# Patient Record
Sex: MALE | Race: WHITE | NOT HISPANIC OR LATINO
[De-identification: names, ages, dates, MRNs, and addresses within clinical notes are randomized per-mention and may not be internally consistent; named-entity substitution may affect disease eponyms.]

---

## 2017-12-13 ENCOUNTER — APPOINTMENT (OUTPATIENT)
Dept: ORTHOPEDIC SURGERY | Facility: CLINIC | Age: 62
End: 2017-12-13
Payer: COMMERCIAL

## 2017-12-13 VITALS — HEIGHT: 72 IN | WEIGHT: 175 LBS | RESPIRATION RATE: 16 BRPM | BODY MASS INDEX: 23.7 KG/M2

## 2017-12-13 DIAGNOSIS — Z00.00 ENCOUNTER FOR GENERAL ADULT MEDICAL EXAMINATION W/OUT ABNORMAL FINDINGS: ICD-10-CM

## 2017-12-13 DIAGNOSIS — Z78.9 OTHER SPECIFIED HEALTH STATUS: ICD-10-CM

## 2017-12-13 DIAGNOSIS — K31.9 DISEASE OF STOMACH AND DUODENUM, UNSPECIFIED: ICD-10-CM

## 2017-12-13 DIAGNOSIS — Z87.39 PERSONAL HISTORY OF OTHER DISEASES OF THE MUSCULOSKELETAL SYSTEM AND CONNECTIVE TISSUE: ICD-10-CM

## 2017-12-13 PROCEDURE — 20605 DRAIN/INJ JOINT/BURSA W/O US: CPT | Mod: RT

## 2017-12-13 PROCEDURE — 99203 OFFICE O/P NEW LOW 30 MIN: CPT | Mod: 25

## 2017-12-13 PROCEDURE — 73070 X-RAY EXAM OF ELBOW: CPT | Mod: RT

## 2023-07-17 ENCOUNTER — NON-APPOINTMENT (OUTPATIENT)
Age: 68
End: 2023-07-17

## 2023-07-21 ENCOUNTER — APPOINTMENT (OUTPATIENT)
Dept: PULMONOLOGY | Facility: CLINIC | Age: 68
End: 2023-07-21
Payer: MEDICARE

## 2023-07-21 VITALS
DIASTOLIC BLOOD PRESSURE: 80 MMHG | WEIGHT: 177 LBS | HEART RATE: 73 BPM | SYSTOLIC BLOOD PRESSURE: 120 MMHG | OXYGEN SATURATION: 98 % | HEIGHT: 72 IN | BODY MASS INDEX: 23.98 KG/M2

## 2023-07-21 DIAGNOSIS — R91.1 SOLITARY PULMONARY NODULE: ICD-10-CM

## 2023-07-21 DIAGNOSIS — E78.5 HYPERLIPIDEMIA, UNSPECIFIED: ICD-10-CM

## 2023-07-21 DIAGNOSIS — R05.3 CHRONIC COUGH: ICD-10-CM

## 2023-07-21 PROCEDURE — 99204 OFFICE O/P NEW MOD 45 MIN: CPT

## 2023-07-21 RX ORDER — ROSUVASTATIN CALCIUM 20 MG/1
20 TABLET, FILM COATED ORAL
Refills: 0 | Status: ACTIVE | COMMUNITY

## 2023-07-21 NOTE — HISTORY OF PRESENT ILLNESS
[FreeTextEntry1] : Intermittent cough and a history of pulmonary nodule. [de-identified] :   68-year-old male non-smoker.  About 3 years ago he developed a cough which lasted about 2 to 3 months.  He was tested for COVID and was negative.  The cough resolved.  The next year the cough came back and lasted about 3 to 4 months.  There were no preceding URI type symptoms.  The etiology of the cough is unclear.  A chest x-ray was done at that time and was negative.  The cough then reoccurred this past winter and lasted about 2 months.  The cough was dry with no shortness of breath or wheezing.  There were no nasal symptoms and rare heartburn.  The cough has been better over the past 3 months.  He now is asymptomatic.  He also underwent a CT calcium score in May which revealed a 3 mm left lower lobe nodule.  He is somewhat anxious about this finding.  There is no known allergies.  He denies anorexia or weight loss fever or chills.  Past medical history hyperlipidemia on Crestor.  Current O2 sat 99 on room air.

## 2023-07-21 NOTE — ASSESSMENT
[FreeTextEntry1] :   Patient with an intermittent cough of unclear etiology.  This may be due to post viral URI inflammation.  In any case I feel this is of no clinical significance and patient is advised to return if he starts coughing again.  In terms of the 3 mm nodule in the non-smoker I have explained to the patient that the guidelines tell us not to repeat the CAT scan.  Patient will return on a as needed basis.  He has been reassured.

## 2024-02-06 ENCOUNTER — APPOINTMENT (OUTPATIENT)
Dept: ORTHOPEDIC SURGERY | Facility: CLINIC | Age: 69
End: 2024-02-06
Payer: MEDICARE

## 2024-02-06 VITALS — BODY MASS INDEX: 23.7 KG/M2 | RESPIRATION RATE: 16 BRPM | HEIGHT: 72 IN | WEIGHT: 175 LBS

## 2024-02-06 DIAGNOSIS — M72.0 PALMAR FASCIAL FIBROMATOSIS [DUPUYTREN]: ICD-10-CM

## 2024-02-06 DIAGNOSIS — Z78.9 OTHER SPECIFIED HEALTH STATUS: ICD-10-CM

## 2024-02-06 PROCEDURE — 73110 X-RAY EXAM OF WRIST: CPT | Mod: 50

## 2024-02-06 PROCEDURE — 20605 DRAIN/INJ JOINT/BURSA W/O US: CPT | Mod: RT

## 2024-02-06 PROCEDURE — 99204 OFFICE O/P NEW MOD 45 MIN: CPT | Mod: 25

## 2024-02-06 NOTE — PHYSICAL EXAM
[de-identified] : Tender at [facility] on the right.  No DRUJ or ECU instability.  Nontender dorsal triquetrum or pisiform triquetral joint  Bilateral middle finger Dupuytren's nodule without contracture no knuckle pads [de-identified] : PA lateral oblique x-rays of both wrist do not show any acute osseous abnormality.  There is ulnar neutral variance

## 2024-02-06 NOTE — HISTORY OF PRESENT ILLNESS
[Right] : right hand dominant [FreeTextEntry1] : Patient presents for evaluation of his right ulnar wrist pain with certain activities which began 2 months ago without trauma.  Patient attributes the symptoms to playing tennis.   He has not had any specific injury.    He is also here for evaluation of his bilateral middle finger palmar cords which he developed several months ago.  He denies any pain in the palm.  He has not had any treatment for this.

## 2024-02-06 NOTE — ASSESSMENT
[FreeTextEntry1] : My impression is that the patient has ulnar-sided wrist pain.  We discussed treatment options and shared decision-making was undertaken.  I recommend a right ulnocarpal wrist injection.  The risks benefits and alternatives were discussed including, but not limited to subcutaneous atrophy, skin depigmentation, nerve injury, infection, recurrence of symptoms, CRPS etc.  The patient agreed and under informed consent and sterile conditions the wrist was injected with half cc of Kenalog combined with 2% plain lidocaine.  A sterile Band-Aid was placed.  Will also administer wrist widget.  The patient has also bilateral Dupuytren's disease without contracture for which she will do tabletop testing

## 2024-04-18 ENCOUNTER — APPOINTMENT (OUTPATIENT)
Dept: ORTHOPEDIC SURGERY | Facility: CLINIC | Age: 69
End: 2024-04-18
Payer: MEDICARE

## 2024-04-18 DIAGNOSIS — M77.11 LATERAL EPICONDYLITIS, RIGHT ELBOW: ICD-10-CM

## 2024-04-18 DIAGNOSIS — M25.521 PAIN IN RIGHT ELBOW: ICD-10-CM

## 2024-04-18 PROCEDURE — 99442: CPT | Mod: 93

## 2024-04-19 PROBLEM — M77.11 RIGHT LATERAL EPICONDYLITIS: Status: ACTIVE | Noted: 2017-12-13

## 2024-04-19 PROBLEM — M25.521 RIGHT ELBOW PAIN: Status: ACTIVE | Noted: 2017-12-13

## 2024-05-02 ENCOUNTER — APPOINTMENT (OUTPATIENT)
Dept: ORTHOPEDIC SURGERY | Facility: CLINIC | Age: 69
End: 2024-05-02
Payer: MEDICARE

## 2024-05-02 DIAGNOSIS — M25.531 PAIN IN RIGHT WRIST: ICD-10-CM

## 2024-05-02 PROCEDURE — 20605 DRAIN/INJ JOINT/BURSA W/O US: CPT | Mod: RT

## 2024-05-02 PROCEDURE — 99203 OFFICE O/P NEW LOW 30 MIN: CPT | Mod: 25

## 2024-05-02 PROCEDURE — 99213 OFFICE O/P EST LOW 20 MIN: CPT | Mod: 25

## 2024-05-02 NOTE — PHYSICAL EXAM
[de-identified] : Tender at [facility] on the right.  No DRUJ or ECU instability.  Nontender dorsal triquetrum or pisiform triquetral joint.  Nontender over dorsal wrist where the ganglion is on the MRI  Bilateral middle finger Dupuytren's nodule without contracture no knuckle pads

## 2024-05-02 NOTE — ASSESSMENT
[FreeTextEntry1] : Patient with atraumatic onset of ulnar-sided wrist pain MRI demonstrating peripheral partial tear without instability on clinical exam.  No improvement from a prior injection in February.  He desired a second injection and a course of therapy  My impression is that the patient has ulnar-sided wrist pain.  We discussed treatment options and shared decision-making was undertaken.  I recommend a right ulnocarpal wrist injection.  The risks benefits and alternatives were discussed including, but not limited to subcutaneous atrophy, skin depigmentation, nerve injury, infection, recurrence of symptoms, CRPS etc.  The patient agreed and under informed consent and sterile conditions the wrist was injected with half cc of Kenalog combined with 2% plain lidocaine.  A sterile Band-Aid was placed.  Will also prescribe a course of therapy.  Hopefully this will improve

## 2024-05-02 NOTE — HISTORY OF PRESENT ILLNESS
[Right] : right hand dominant [FreeTextEntry1] : Patient returns for follow up of right ulnar sided pain. 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  Patient had a right ulnocarpal wrist injection with no relief on February 6, 2024.

## 2024-09-30 ENCOUNTER — APPOINTMENT (OUTPATIENT)
Dept: PULMONOLOGY | Facility: CLINIC | Age: 69
End: 2024-09-30
Payer: MEDICARE

## 2024-09-30 VITALS
OXYGEN SATURATION: 99 % | DIASTOLIC BLOOD PRESSURE: 80 MMHG | HEIGHT: 72 IN | WEIGHT: 175 LBS | BODY MASS INDEX: 23.7 KG/M2 | SYSTOLIC BLOOD PRESSURE: 130 MMHG | HEART RATE: 68 BPM

## 2024-09-30 DIAGNOSIS — R05.3 CHRONIC COUGH: ICD-10-CM

## 2024-09-30 PROCEDURE — 99213 OFFICE O/P EST LOW 20 MIN: CPT

## 2024-09-30 NOTE — ASSESSMENT
[FreeTextEntry1] : Patient with a postinflammatory cough after a viral upper respiratory tract infection.  Patient has been reassured that the cough will resolve over the next few weeks.  He may take a cough suppressant as needed and call my office in 1 month for follow-up.

## 2024-09-30 NOTE — HISTORY OF PRESENT ILLNESS
[TextBox_4] : In late2 weeks ago patient has sore throat.  This resolved in about 2 to 3 days but the patient was left with a dry cough which persists.  No fever or chills no shortness of breath or wheezing.  He states he always gets this type of condition winter or early spring.  He always has a protracted cough.  He was feeling well up until this recent illness.  No weight loss anorexia.  Patient is non-smoker.

## 2024-11-01 VITALS — BODY MASS INDEX: 23.7 KG/M2 | WEIGHT: 175 LBS | HEIGHT: 72 IN | RESPIRATION RATE: 16 BRPM

## 2024-11-04 ENCOUNTER — TRANSCRIPTION ENCOUNTER (OUTPATIENT)
Age: 69
End: 2024-11-04

## 2024-11-04 ENCOUNTER — APPOINTMENT (OUTPATIENT)
Dept: ORTHOPEDIC SURGERY | Facility: CLINIC | Age: 69
End: 2024-11-04
Payer: MEDICARE

## 2024-11-04 DIAGNOSIS — M25.531 PAIN IN RIGHT WRIST: ICD-10-CM

## 2024-11-04 PROCEDURE — 99214 OFFICE O/P EST MOD 30 MIN: CPT | Mod: 25

## 2024-11-04 PROCEDURE — 20605 DRAIN/INJ JOINT/BURSA W/O US: CPT | Mod: RT

## 2024-12-06 ENCOUNTER — NON-APPOINTMENT (OUTPATIENT)
Age: 69
End: 2024-12-06

## 2024-12-06 ENCOUNTER — APPOINTMENT (OUTPATIENT)
Dept: ORTHOPEDIC SURGERY | Facility: CLINIC | Age: 69
End: 2024-12-06
Payer: MEDICARE

## 2024-12-06 DIAGNOSIS — M25.531 PAIN IN RIGHT WRIST: ICD-10-CM

## 2024-12-06 PROCEDURE — 99442: CPT | Mod: 93

## 2025-01-06 ENCOUNTER — APPOINTMENT (OUTPATIENT)
Dept: ORTHOPEDIC SURGERY | Facility: CLINIC | Age: 70
End: 2025-01-06
Payer: MEDICARE

## 2025-01-06 VITALS — HEIGHT: 72 IN | BODY MASS INDEX: 23.7 KG/M2 | RESPIRATION RATE: 16 BRPM | WEIGHT: 175 LBS

## 2025-01-06 DIAGNOSIS — M25.531 PAIN IN RIGHT WRIST: ICD-10-CM

## 2025-01-06 PROCEDURE — 0232T NJX PLATELET PLASMA: CPT

## 2025-01-06 PROCEDURE — 99214 OFFICE O/P EST MOD 30 MIN: CPT | Mod: 25

## 2025-02-10 ENCOUNTER — APPOINTMENT (OUTPATIENT)
Dept: ORTHOPEDIC SURGERY | Facility: CLINIC | Age: 70
End: 2025-02-10
Payer: MEDICARE

## 2025-02-10 VITALS — BODY MASS INDEX: 23.7 KG/M2 | HEIGHT: 72 IN | RESPIRATION RATE: 16 BRPM | WEIGHT: 175 LBS

## 2025-02-10 DIAGNOSIS — M25.531 PAIN IN RIGHT WRIST: ICD-10-CM

## 2025-02-10 PROCEDURE — 99214 OFFICE O/P EST MOD 30 MIN: CPT

## 2025-09-18 ENCOUNTER — NON-APPOINTMENT (OUTPATIENT)
Age: 70
End: 2025-09-18